# Patient Record
Sex: FEMALE | Race: WHITE | NOT HISPANIC OR LATINO | URBAN - METROPOLITAN AREA
[De-identification: names, ages, dates, MRNs, and addresses within clinical notes are randomized per-mention and may not be internally consistent; named-entity substitution may affect disease eponyms.]

---

## 2023-10-05 ENCOUNTER — LAB (OUTPATIENT)
Dept: LAB | Facility: LAB | Age: 48
End: 2023-10-05
Payer: COMMERCIAL

## 2023-10-05 DIAGNOSIS — G40.309 GENERALIZED IDIOPATHIC EPILEPSY AND EPILEPTIC SYNDROMES, NOT INTRACTABLE, WITHOUT STATUS EPILEPTICUS (MULTI): ICD-10-CM

## 2023-10-05 DIAGNOSIS — R55 SYNCOPE AND COLLAPSE: Primary | ICD-10-CM

## 2023-10-05 LAB
ALBUMIN SERPL-MCNC: 4.5 G/DL (ref 3.5–5)
ALP BLD-CCNC: 69 U/L (ref 35–125)
ALT SERPL-CCNC: 10 U/L (ref 5–40)
ANION GAP SERPL CALC-SCNC: 12 MMOL/L
AST SERPL-CCNC: 12 U/L (ref 5–40)
BASOPHILS # BLD AUTO: 0.04 X10*3/UL (ref 0–0.1)
BASOPHILS NFR BLD AUTO: 0.4 %
BILIRUB SERPL-MCNC: 1.9 MG/DL (ref 0.1–1.2)
BUN SERPL-MCNC: 13 MG/DL (ref 8–25)
CALCIUM SERPL-MCNC: 9.7 MG/DL (ref 8.5–10.4)
CHLORIDE SERPL-SCNC: 105 MMOL/L (ref 97–107)
CO2 SERPL-SCNC: 27 MMOL/L (ref 24–31)
CREAT SERPL-MCNC: 0.6 MG/DL (ref 0.4–1.6)
EOSINOPHIL # BLD AUTO: 0.02 X10*3/UL (ref 0–0.7)
EOSINOPHIL NFR BLD AUTO: 0.2 %
ERYTHROCYTE [DISTWIDTH] IN BLOOD BY AUTOMATED COUNT: 12.4 % (ref 11.5–14.5)
GFR SERPL CREATININE-BSD FRML MDRD: >90 ML/MIN/1.73M*2
GLUCOSE SERPL-MCNC: 86 MG/DL (ref 65–99)
HCT VFR BLD AUTO: 43.9 % (ref 36–46)
HGB BLD-MCNC: 14.3 G/DL (ref 12–16)
IMM GRANULOCYTES # BLD AUTO: 0.04 X10*3/UL (ref 0–0.7)
IMM GRANULOCYTES NFR BLD AUTO: 0.4 % (ref 0–0.9)
LYMPHOCYTES # BLD AUTO: 4.07 X10*3/UL (ref 1.2–4.8)
LYMPHOCYTES NFR BLD AUTO: 38.7 %
MCH RBC QN AUTO: 29.3 PG (ref 26–34)
MCHC RBC AUTO-ENTMCNC: 32.6 G/DL (ref 32–36)
MCV RBC AUTO: 90 FL (ref 80–100)
MONOCYTES # BLD AUTO: 0.55 X10*3/UL (ref 0.1–1)
MONOCYTES NFR BLD AUTO: 5.2 %
NEUTROPHILS # BLD AUTO: 5.8 X10*3/UL (ref 1.2–7.7)
NEUTROPHILS NFR BLD AUTO: 55.1 %
NRBC BLD-RTO: 0 /100 WBCS (ref 0–0)
PLATELET # BLD AUTO: 325 X10*3/UL (ref 150–450)
PMV BLD AUTO: 10.6 FL (ref 7.5–11.5)
POTASSIUM SERPL-SCNC: 3.9 MMOL/L (ref 3.4–5.1)
PROT SERPL-MCNC: 7.9 G/DL (ref 5.9–7.9)
RBC # BLD AUTO: 4.88 X10*6/UL (ref 4–5.2)
SODIUM SERPL-SCNC: 144 MMOL/L (ref 133–145)
TSH SERPL DL<=0.05 MIU/L-ACNC: 0.64 MIU/L (ref 0.27–4.2)
VALPROATE SERPL-MCNC: <3 UG/ML (ref 50–100)
WBC # BLD AUTO: 10.5 X10*3/UL (ref 4.4–11.3)

## 2023-10-05 PROCEDURE — 80164 ASSAY DIPROPYLACETIC ACD TOT: CPT

## 2023-10-05 PROCEDURE — 36415 COLL VENOUS BLD VENIPUNCTURE: CPT

## 2023-10-05 PROCEDURE — 80050 GENERAL HEALTH PANEL: CPT

## 2023-11-10 ENCOUNTER — LAB (OUTPATIENT)
Dept: LAB | Facility: LAB | Age: 48
End: 2023-11-10
Payer: COMMERCIAL

## 2023-11-10 DIAGNOSIS — G40.309 GENERALIZED IDIOPATHIC EPILEPSY AND EPILEPTIC SYNDROMES, NOT INTRACTABLE, WITHOUT STATUS EPILEPTICUS (MULTI): Primary | ICD-10-CM

## 2023-11-10 LAB — VALPROATE SERPL-MCNC: 59 UG/ML (ref 50–100)

## 2023-11-10 PROCEDURE — 80164 ASSAY DIPROPYLACETIC ACD TOT: CPT

## 2024-09-30 ENCOUNTER — APPOINTMENT (OUTPATIENT)
Dept: CARDIOLOGY | Facility: HOSPITAL | Age: 49
End: 2024-09-30
Payer: COMMERCIAL

## 2024-09-30 ENCOUNTER — HOSPITAL ENCOUNTER (EMERGENCY)
Facility: HOSPITAL | Age: 49
Discharge: OTHER INSTITUTION | End: 2024-10-01
Attending: STUDENT IN AN ORGANIZED HEALTH CARE EDUCATION/TRAINING PROGRAM
Payer: COMMERCIAL

## 2024-09-30 DIAGNOSIS — R45.851 SUICIDAL IDEATIONS: Primary | ICD-10-CM

## 2024-09-30 LAB
ALBUMIN SERPL BCP-MCNC: 4.7 G/DL (ref 3.4–5)
ALP SERPL-CCNC: 61 U/L (ref 33–110)
ALT SERPL W P-5'-P-CCNC: 11 U/L (ref 7–45)
ANION GAP SERPL CALCULATED.3IONS-SCNC: 13 MMOL/L (ref 10–20)
APAP SERPL-MCNC: <10 UG/ML
AST SERPL W P-5'-P-CCNC: 14 U/L (ref 9–39)
BASOPHILS # BLD AUTO: 0.03 X10*3/UL (ref 0–0.1)
BASOPHILS NFR BLD AUTO: 0.3 %
BILIRUB SERPL-MCNC: 0.5 MG/DL (ref 0–1.2)
BUN SERPL-MCNC: 10 MG/DL (ref 6–23)
CALCIUM SERPL-MCNC: 10 MG/DL (ref 8.6–10.3)
CHLORIDE SERPL-SCNC: 100 MMOL/L (ref 98–107)
CO2 SERPL-SCNC: 28 MMOL/L (ref 21–32)
CREAT SERPL-MCNC: 0.64 MG/DL (ref 0.5–1.05)
EGFRCR SERPLBLD CKD-EPI 2021: >90 ML/MIN/1.73M*2
EOSINOPHIL # BLD AUTO: 0.08 X10*3/UL (ref 0–0.7)
EOSINOPHIL NFR BLD AUTO: 0.7 %
ERYTHROCYTE [DISTWIDTH] IN BLOOD BY AUTOMATED COUNT: 12.5 % (ref 11.5–14.5)
ETHANOL SERPL-MCNC: <10 MG/DL
GLUCOSE SERPL-MCNC: 98 MG/DL (ref 74–99)
HCT VFR BLD AUTO: 49.6 % (ref 36–46)
HGB BLD-MCNC: 16.1 G/DL (ref 12–16)
IMM GRANULOCYTES # BLD AUTO: 0.03 X10*3/UL (ref 0–0.7)
IMM GRANULOCYTES NFR BLD AUTO: 0.3 % (ref 0–0.9)
LYMPHOCYTES # BLD AUTO: 3.39 X10*3/UL (ref 1.2–4.8)
LYMPHOCYTES NFR BLD AUTO: 28.5 %
MCH RBC QN AUTO: 29.4 PG (ref 26–34)
MCHC RBC AUTO-ENTMCNC: 32.5 G/DL (ref 32–36)
MCV RBC AUTO: 91 FL (ref 80–100)
MONOCYTES # BLD AUTO: 0.63 X10*3/UL (ref 0.1–1)
MONOCYTES NFR BLD AUTO: 5.3 %
NEUTROPHILS # BLD AUTO: 7.75 X10*3/UL (ref 1.2–7.7)
NEUTROPHILS NFR BLD AUTO: 64.9 %
NRBC BLD-RTO: 0 /100 WBCS (ref 0–0)
PLATELET # BLD AUTO: 348 X10*3/UL (ref 150–450)
POTASSIUM SERPL-SCNC: 4.1 MMOL/L (ref 3.5–5.3)
PROT SERPL-MCNC: 8.9 G/DL (ref 6.4–8.2)
RBC # BLD AUTO: 5.48 X10*6/UL (ref 4–5.2)
SALICYLATES SERPL-MCNC: <3 MG/DL
SODIUM SERPL-SCNC: 137 MMOL/L (ref 136–145)
WBC # BLD AUTO: 11.9 X10*3/UL (ref 4.4–11.3)

## 2024-09-30 PROCEDURE — 80320 DRUG SCREEN QUANTALCOHOLS: CPT

## 2024-09-30 PROCEDURE — 81001 URINALYSIS AUTO W/SCOPE: CPT

## 2024-09-30 PROCEDURE — 80053 COMPREHEN METABOLIC PANEL: CPT

## 2024-09-30 PROCEDURE — 93005 ELECTROCARDIOGRAM TRACING: CPT

## 2024-09-30 PROCEDURE — 80143 DRUG ASSAY ACETAMINOPHEN: CPT

## 2024-09-30 PROCEDURE — 85025 COMPLETE CBC W/AUTO DIFF WBC: CPT

## 2024-09-30 PROCEDURE — 90839 PSYTX CRISIS INITIAL 60 MIN: CPT

## 2024-09-30 PROCEDURE — 99285 EMERGENCY DEPT VISIT HI MDM: CPT

## 2024-09-30 PROCEDURE — 81025 URINE PREGNANCY TEST: CPT

## 2024-09-30 PROCEDURE — 36415 COLL VENOUS BLD VENIPUNCTURE: CPT

## 2024-09-30 PROCEDURE — 80307 DRUG TEST PRSMV CHEM ANLYZR: CPT

## 2024-09-30 SDOH — HEALTH STABILITY: MENTAL HEALTH: NON-SPECIFIC ACTIVE SUICIDAL THOUGHTS (PAST 1 MONTH): YES

## 2024-09-30 SDOH — HEALTH STABILITY: MENTAL HEALTH: HAVE YOU EVER TRIED TO KILL YOURSELF?: NO

## 2024-09-30 SDOH — ECONOMIC STABILITY: HOUSING INSECURITY: FEELS SAFE LIVING IN HOME: OTHER (COMMENT)

## 2024-09-30 SDOH — HEALTH STABILITY: MENTAL HEALTH: IN THE PAST WEEK, HAVE YOU BEEN HAVING THOUGHTS ABOUT KILLING YOURSELF?: YES

## 2024-09-30 SDOH — HEALTH STABILITY: MENTAL HEALTH: SUICIDAL BEHAVIOR (LIFETIME): NO

## 2024-09-30 SDOH — HEALTH STABILITY: MENTAL HEALTH: ACTIVE SUICIDAL IDEATION WITH SPECIFIC PLAN AND INTENT (PAST 1 MONTH): NO

## 2024-09-30 SDOH — HEALTH STABILITY: MENTAL HEALTH: IN THE PAST FEW WEEKS, HAVE YOU FELT THAT YOU OR YOUR FAMILY WOULD BE BETTER OFF IF YOU WERE DEAD?: YES

## 2024-09-30 SDOH — HEALTH STABILITY: MENTAL HEALTH: ACTIVE SUICIDAL IDEATION WITH SOME INTENT TO ACT, WITHOUT SPECIFIC PLAN (PAST 1 MONTH): NO

## 2024-09-30 SDOH — ECONOMIC STABILITY: GENERAL: FINANCIAL CONCERNS: UNABLE TO MEET BASIC NEEDS;OTHER (COMMENT)

## 2024-09-30 SDOH — HEALTH STABILITY: MENTAL HEALTH: WISH TO BE DEAD (PAST 1 MONTH): YES

## 2024-09-30 SDOH — HEALTH STABILITY: MENTAL HEALTH: ARE YOU HAVING THOUGHTS OF KILLING YOURSELF RIGHT NOW?: NO

## 2024-09-30 SDOH — HEALTH STABILITY: MENTAL HEALTH: ANXIETY SYMPTOMS: GENERALIZED

## 2024-09-30 SDOH — HEALTH STABILITY: MENTAL HEALTH: DEPRESSION SYMPTOMS: FEELINGS OF HOPELESSESS;FEELINGS OF HELPLESSNESS

## 2024-09-30 SDOH — HEALTH STABILITY: MENTAL HEALTH: IN THE PAST FEW WEEKS, HAVE YOU WISHED YOU WERE DEAD?: YES

## 2024-09-30 ASSESSMENT — PAIN SCALES - GENERAL: PAINLEVEL_OUTOF10: 0 - NO PAIN

## 2024-09-30 ASSESSMENT — LIFESTYLE VARIABLES
EVER FELT BAD OR GUILTY ABOUT YOUR DRINKING: NO
TOTAL SCORE: 0
SUBSTANCE_ABUSE_PAST_12_MONTHS: NO
PRESCIPTION_ABUSE_PAST_12_MONTHS: NO
HAVE PEOPLE ANNOYED YOU BY CRITICIZING YOUR DRINKING: NO
EVER HAD A DRINK FIRST THING IN THE MORNING TO STEADY YOUR NERVES TO GET RID OF A HANGOVER: NO
HAVE YOU EVER FELT YOU SHOULD CUT DOWN ON YOUR DRINKING: NO

## 2024-09-30 ASSESSMENT — COLUMBIA-SUICIDE SEVERITY RATING SCALE - C-SSRS
6. HAVE YOU EVER DONE ANYTHING, STARTED TO DO ANYTHING, OR PREPARED TO DO ANYTHING TO END YOUR LIFE?: NO
1. IN THE PAST MONTH, HAVE YOU WISHED YOU WERE DEAD OR WISHED YOU COULD GO TO SLEEP AND NOT WAKE UP?: NO
2. HAVE YOU ACTUALLY HAD ANY THOUGHTS OF KILLING YOURSELF?: NO

## 2024-09-30 ASSESSMENT — PAIN - FUNCTIONAL ASSESSMENT: PAIN_FUNCTIONAL_ASSESSMENT: 0-10

## 2024-09-30 NOTE — ED PROVIDER NOTES
"Patient was seen by both myself and advanced practitioner.  I personally saw the patient and made/approved the management plan and take responsibility for the patient management     Patient is a 49-year-old female that presents to the emergency department for evaluation of suicidal ideation.  She was brought in by police after making statements  earlier stating that she wanted to kill herself and that she would do it if she had a gun.  Patient does not have access to gun at this time and states that she was just \"joking\".  Patient was placed on involuntary hold by police and brought in for further psychiatric evaluation.  Patient does admit to saying that these things and that they were unprompted.  She states that she is struggling and needs to find help with housing as this has become difficult since losing her .    On exam patient anxious appearing and is very fidgety.  She has trouble sitting still at this time.  She does admit that she stated that she was suicidal however states that she was only joking and has no intent of actually hurting herself.  She states she is never tried in the past.  Respirations easy, nonlabored.  She is hemodynamically stable.  She is ambulating with a steady gait at this time.  She does not appear internally stimulated.  Blood work ordered for medical screening purposes including CBC, CMP, serum drug screen, urine drug screen, urinalysis.  Patient on involuntary hold at this time and will be evaluated by ED social work once medically cleared for placement as I am very concerned as she has multiple stressors and risk factors and I do not feel it is safe for patient to go home at this time.  Patient was evaluated by ED crisis who also agrees patient would benefit from inpatient psychiatric treatment.  Patient accepted for further inpatient treatment by Dr. Contreras at [facility].    I independently interpreted the following study(s) EKG which show normal sinus rhythm at 82 " bpm, no evidence of STEMI.       Demond Alvarado,   09/30/24 2016

## 2024-09-30 NOTE — ED TRIAGE NOTES
Third party called 911 after pt made suicidal threats, denies suicidal ideations for EMS. Pink slipped by PD. Pt recently had their spouse pass away. Pt is staying at friends' home, pt is homeless at this time. Pt denies suicidal ideations at facility, is requesting  to cope with loss. Pt denies drug use.

## 2024-09-30 NOTE — ED PROVIDER NOTES
"HPI   Chief Complaint   Patient presents with    Psychiatric Evaluation       Patient is a 49-year-old female with past medical history of cerebral palsy presenting via the police as a pink slipped for suicidal ideations.  Report is that patient made suicidal comments earlier stating that she would like to kill herself if she had a gun.  She states that he does not have access to a gun at this time.  States she has never had these thoughts before.  She states that she was \"joking\".  She states she is not feeling violent towards herself or others.  Denies AVH.  Denies tobacco, illicit drug, or alcohol use.  States she has not been hospitalized for mental health.  Patient denies fevers, chills, cough, sore throat, runny nose, chest pain, shortness of breath, abdominal pain, nausea, vomiting, diarrhea or urinary complaints.  Patient does endorse that she is sleeping with a friend due to her  having recently passed away.  She states that a friend is the one that called 911.               Patient History   No past medical history on file.  No past surgical history on file.  No family history on file.  Social History     Tobacco Use    Smoking status: Not on file    Smokeless tobacco: Not on file   Substance Use Topics    Alcohol use: Not on file    Drug use: Not on file       Physical Exam   ED Triage Vitals   Temp Pulse Resp BP   -- -- -- --      SpO2 Temp src Heart Rate Source Patient Position   -- -- -- --      BP Location FiO2 (%)     -- --       Physical Exam  Vitals and nursing note reviewed.   Constitutional:       Appearance: She is well-developed.      Comments: Awake, sitting in EMS cot   HENT:      Head: Normocephalic and atraumatic.      Nose: Nose normal.      Mouth/Throat:      Mouth: Mucous membranes are moist.      Comments: Poor dentition noted  Eyes:      Extraocular Movements: Extraocular movements intact.      Conjunctiva/sclera: Conjunctivae normal.      Pupils: Pupils are equal, round, and " reactive to light.   Cardiovascular:      Rate and Rhythm: Normal rate and regular rhythm.      Pulses: Normal pulses.      Heart sounds: Normal heart sounds. No murmur heard.  Pulmonary:      Effort: Pulmonary effort is normal. No respiratory distress.      Breath sounds: Normal breath sounds.   Abdominal:      General: Abdomen is flat.      Palpations: Abdomen is soft.      Tenderness: There is no abdominal tenderness.   Musculoskeletal:         General: No swelling. Normal range of motion.      Cervical back: Normal range of motion and neck supple.   Skin:     General: Skin is warm and dry.      Capillary Refill: Capillary refill takes less than 2 seconds.   Neurological:      General: No focal deficit present.      Mental Status: She is alert and oriented to person, place, and time.   Psychiatric:         Mood and Affect: Mood normal.         Behavior: Behavior normal.           ED Course & MDM   ED Course as of 10/01/24 0101   Mon Sep 30, 2024   1820 EKG Time:1819  EKG Interpretation time:1820  EKG Interpretation: EKG shows normal sinus rhythm with a rate 82 bpm, normal axis, QTc normal at 422, no evidence of STEMI.    EKG was interpreted by myself independently [JL]   Tue Oct 01, 2024   0057 Urinalysis with Reflex Culture and Microscopic(!)  No evidence of urinary tract infection [EG]   0057 Drug Screen, Urine(!)  Positive for cannabis and otherwise negative [EG]      ED Course User Index  [EG] Josefina Matson MD  [JL] Demond Alvarado,          Diagnoses as of 10/01/24 0101   Suicidal ideations                 No data recorded     Una Coma Scale Score: 15 (09/30/24 1543 : Diego Barron, EMT)                           Medical Decision Making  Patient is a 49-year-old female with past medical history of cerebral palsy presenting via the police as a pink slip for suicidal ideations.  Lab work, urine ordered.  Social work consult placed.  Conditions considered include but are not limited to:  Anxiety, depression, bipolar disorder, schizophrenia.    I saw this patient in conjunction with Dr. Alvarado.  CBC does show mild leukocytosis with WBCs of 11.9 as well as hemoglobin of 16.1.  This appears to be hemoconcentration.  CMP without significant electrolyte abnormality or renal impairment.  Toxicology panel is negative.  Urine pregnancy is negative.  Urine drug screen is positive for cannabinoid use.  UA with micro is without infection.    Patient is medically cleared for placement.  Patient has been accepted to Dr. Contreras at [facility].  As I deemed necessary from the patient's history, physical, laboratory and imaging findings as well as ED course, I considered the above listed diagnoses.  Patient is currently pending transport    Portions of this note made with Dragon software, please be mindful of potential grammatical errors.        Labs Reviewed   CBC WITH AUTO DIFFERENTIAL - Abnormal       Result Value    WBC 11.9 (*)     nRBC 0.0      RBC 5.48 (*)     Hemoglobin 16.1 (*)     Hematocrit 49.6 (*)     MCV 91      MCH 29.4      MCHC 32.5      RDW 12.5      Platelets 348      Neutrophils % 64.9      Immature Granulocytes %, Automated 0.3      Lymphocytes % 28.5      Monocytes % 5.3      Eosinophils % 0.7      Basophils % 0.3      Neutrophils Absolute 7.75 (*)     Immature Granulocytes Absolute, Automated 0.03      Lymphocytes Absolute 3.39      Monocytes Absolute 0.63      Eosinophils Absolute 0.08      Basophils Absolute 0.03     COMPREHENSIVE METABOLIC PANEL - Abnormal    Glucose 98      Sodium 137      Potassium 4.1      Chloride 100      Bicarbonate 28      Anion Gap 13      Urea Nitrogen 10      Creatinine 0.64      eGFR >90      Calcium 10.0      Albumin 4.7      Alkaline Phosphatase 61      Total Protein 8.9 (*)     AST 14      Bilirubin, Total 0.5      ALT 11     DRUG SCREEN,URINE - Abnormal    Amphetamine Screen, Urine Presumptive Negative      Barbiturate Screen, Urine Presumptive Negative       Benzodiazepines Screen, Urine Presumptive Negative      Cannabinoid Screen, Urine Presumptive Positive (*)     Cocaine Metabolite Screen, Urine Presumptive Negative      Fentanyl Screen, Urine Presumptive Negative      Opiate Screen, Urine Presumptive Negative      Oxycodone Screen, Urine Presumptive Negative      PCP Screen, Urine Presumptive Negative      Methadone Screen, Urine Presumptive Negative      Narrative:     Drug screen results are presumptive and should not be used to assess   compliance with prescribed medication. Contact the performing [facility] laboratory   to add-on definitive confirmatory testing if clinically indicated.    Toxicology screening results are reported qualitatively. The concentration must   be greater than or equal to the cutoff to be reported as positive. The concentration   at which the screening test can detect an individual drug or metabolite varies.   The absence of expected drug(s) and/or drug metabolite(s) may indicate non-compliance,   inappropriate timing of specimen collection relative to drug administration, poor drug   absorption, diluted/adulterated urine, or limitations of testing. For medical purposes   only; not valid for forensic use.    Interpretive questions should be directed to the laboratory medical directors.   URINALYSIS WITH REFLEX CULTURE AND MICROSCOPIC - Abnormal    Color, Urine Light-Yellow      Appearance, Urine Clear      Specific Gravity, Urine 1.014      pH, Urine 5.5      Protein, Urine NEGATIVE      Glucose, Urine Normal      Blood, Urine 0.2 (2+) (*)     Ketones, Urine NEGATIVE      Bilirubin, Urine NEGATIVE      Urobilinogen, Urine Normal      Nitrite, Urine NEGATIVE      Leukocyte Esterase, Urine NEGATIVE     URINALYSIS MICROSCOPIC WITH REFLEX CULTURE - Abnormal    WBC, Urine 1-5      RBC, Urine >20 (*)     Squamous Epithelial Cells, Urine 1-9 (SPARSE)      Mucus, Urine 1+     ACUTE TOXICOLOGY PANEL, BLOOD - Normal    Acetaminophen <10.0       Salicylate  <3      Alcohol <10     HCG, URINE, QUALITATIVE - Normal    HCG, Urine NEGATIVE     URINALYSIS WITH REFLEX CULTURE AND MICROSCOPIC    Narrative:     The following orders were created for panel order Urinalysis with Reflex Culture and Microscopic.  Procedure                               Abnormality         Status                     ---------                               -----------         ------                     Urinalysis with Reflex C...[[id number]]  Abnormal            Final result               Extra Urine Gray Tube[[id number]]                                                         Please view results for these tests on the individual orders.   EXTRA URINE GRAY TUBE         Procedure  Procedures     Villa Schulte PA-C  10/01/24 1454

## 2024-09-30 NOTE — PROGRESS NOTES
"EPAT - Social Work Psychiatric Assessment    Arrival Details  Mode of Arrival: Ambulance  Admission Source: Other (Comment)  Admission Type: Involuntary  EPAT Assessment Start Date: 09/30/24  EPAT Assessment Start Time:  (17:20)  Name of : ENOCH Hernandez    History of Present Illness  Admission Reason: Psychiatric Evaluation  HPI: Pt is a 48 y/o F brought in by [facility] under pink slip by [place] police. Per report pt's friend Kristin contacted 911 after pt made SI threat stating \"if I had a gun I would kill myself\". Upon arrival to the scene police state pt denied SI stating she was \"joking\" however pt having multiple stressors recently including loss of  and current homelessness. Social work reviewed Pts chart, medical record, and provider notes which indicate no history of mental health or psychiatric admissions. Records do not show any medication list. Pt has medical history of cerebral palsy and has been seen for physical complaints in the past. Pt continues to deny SI upon arrival to ED staff. The triage risk assessment was reviewed and Pt was inidcated to be no risk during triage assessment however EPAT consult placed as ED provider had concerns for pt's safety.    SW Readmission Information   Readmission within 30 Days: No    Psychiatric Symptoms  Anxiety Symptoms: Generalized  Depression Symptoms: Feelings of hopelessess, Feelings of helplessness  Windy Symptoms: No problems reported or observed.    Psychosis Symptoms  Hallucination Type: No problems reported or observed.  Delusion Type: No problems reported or observed.    Additional Symptoms - Adult  Generalized Anxiety Disorder: No problems reported or observed., Excessive anxiety/worry, Irritability, Difficult to control worry  Panic Attack: No problems reported or observed.  Post Traumatic Stress Disorder: No problems reported or observed.  Delirium: No problems reported or observed.    Past Psychiatric " History/Meds/Treatments  Past Psychiatric History: Pt ina any history of mental illness.  No history present in medical records.  Past Psychiatric Meds/Treatments: Pt ina any psychiatric admissions and ina any mental health providers.  Past Violence/Victimization History: None reported    Current Mental Health Contacts   Name/Phone Number: None  Provider Name/Phone Number: None    Support System: Other (Comment) (Pt has minimal support.)    Living Arrangement: Homeless    Home Safety  Feels Safe Living in Home: Other (Comment) (Pt is currently homeless.)    Income Information  Employment Status for: Patient  Employment Status: Unemployed  Income Source: Unemployed  Financial Concerns: Unable to Meet Basic Needs, Other (Comment) (Pt is currently seeking assitance for houseing.)    [place] Service/Education History  Current or Previous  Service: None    Social/Cultural History  Social History: Pt  recently passed away.  Pt has limited support system.  Cultural Requests During Hospitalization: None  Spiritual Requests During Hospitalization: None    Legal  Legal Considerations: Patient/ Family Ability to Make Healthcare Decisions  Assistance with Managing/Advocating Healthcare Needs: Other (Comment)  Criminal Activity/ Legal Involvement Pertinent to Current Situation/ Hospitalization: None  Legal Concerns: None  Legal Comments: Legal guardian/self, POA/self.    Drug Screening  Have you used any substances (canabis, cocaine, heroin, hallucinogens, inhalants, etc.) in the past 12 months?: No  Have you used any prescription drugs other than prescribed in the past 12 months?: No  Is a toxicology screen needed?: Yes    Stage of Change  Stage of Change: Other (Comment) (N/A)    Behavioral Health  Behavioral Health(WDL): Exceptions to WDL  Behaviors/Mood: Anxious, Impulsive, Irritable  Affect: Inconsistent with thought content  Parent/Guardian/Significant Other Involvement: No  involvement  Needs Expressed: Financial, Emotional    Orientation  Orientation Level: Disoriented X4    General Appearance  Motor Activity: Mannerisms  Speech Pattern: Rapid  General Attitude: Defensive  Appearance/Hygiene: Disheveled, Poor hygiene    Thought Process  Coherency: Disorganized  Content: Unremarkable  Perception: Unable to assess  Hallucination: None  Judgment/Insight: Impaired  Confusion: Mild  Cognition: Poor safety awareness, Poor judgement    Sleep Pattern  Sleep Pattern: Unable to assess    Risk Factors  Self Harm/Suicidal Ideation Plan: Pt aknowledging statements were made however minimizing severity.  Previous Self Harm/Suicidal Plans: Pt ina any past history and records do not show any prior SI.  Risk Factors: Lower socioeconomic status, Unemployment, Other (Comment)    Violence Risk Assessment  Assessment of Violence: None noted  Thoughts of Harm to Others: No    Ability to Assess Risk Screen  Risk Screen - Ability to Assess: Able to be screened  Ask Suicide-Screening Questions  1. In the past few weeks, have you wished you were dead?: Yes  2. In the past few weeks, have you felt that you or your family would be better off if you were dead?: Yes  3. In the past week, have you been having thoughts about killing yourself?: Yes  4. Have you ever tried to kill yourself?: No  5. Are you having thoughts of killing yourself right now?: No  Calculated Risk Score: Potential Risk  [place]orrow Suicide Severity Rating Scale (Screener/Recent Self-Report)  1. Wish to be Dead (Past 1 Month): Yes  2. Non-Specific Active Suicidal Thoughts (Past 1 Month): Yes  3. Active Suicidal Ideation with any Methods (Not Plan) Without Intent to Act (Past 1 Month): No  4. Active Suicidal Ideation with Some Intent to Act, Without Specific Plan (Past 1 Month): No  5. Active Suicidal Ideation with Specific Plan and Intent (Past 1 Month): No  6. Suicidal Behavior (Lifetime): No  Calculated C-SSRS Risk Score (Lifetime/Recent):  "Low Risk  Step 1: Risk Factors  Current & Past Psychiatric Dx: Recent onset  Presenting Symptoms: Hopelessness or despair, Anxiety and/or panic  Precipitants/Stressors: Triggering events leading to humiliation, shame, and/or despair (e.g. loss of relationship, financial or health status) (real or anticipated), Inadequate social supports, Pending incarceration or homelessness  Change in Treatment: Non-compliant or not receiving treatment  Access to Lethal Methods : No  Step 2: Protective Factors   Protective Factors Internal: Other (Comment) (None)  Protective Factors External: Supportive social network or family or friends  Step 3: Suicidal Ideation Intensity  How Many Times Have You Had These Thoughts: Less than once a week  When You Have the Thoughts How Long do They Last : Fleeting - few seconds or minutes  Could/Can You Stop Thinking About Killing Yourself or Wanting to Die if You Want to: Easily able to control thoughts  Are There Things - Anyone or Anything - That Stopped You From Wanting to Die or Acting on: Uncertain that deterrents stopped you  What Sort of Reasons Did You Have For Thinking About Wanting to Die or Killing Yourself: Mostly to end or stop the pain (you couldn't go on living with the pain or how you were feeling)  Total Score: 10  Step 5: Documentation  Risk Level: Low suicide risk    Psychiatric Impression and Plan of Care  Assessment and Plan: Upon assessment Pt presents cooperative but somewhat anxious stating \"I just need a  and help with housing\". Pt minimizing severity of statements made to friend Kristin earlier today. Pt repeatedly states \"I am not that way\" and \"I would never hurt myself or anyone else\" but admits she is going through a lot this past week with  passing and not having stable housing at this time. Pt poor historian with what brought on comments today. She denies that she and friend had any arguments but reports Kristin was helping her look for somewhere " "else to stay. Pt has been staying Kristin's brother's home with her stating \"Kristin was a good friend of my \". Pt appears to believe she can not longer stay at the house with them though and repeatedly tells EPAT that she has \"multiple friends\" she can stay with. Pt could not provide a single name or phone number of a friend EPAT could contact to verify whether pt could safe. EPAT did attempt to contact Kristin for collateral information however no answer. Pt has been dealing with the stress and loss of her  this past Thursday. Pt denies any mental health history and denies being on any medications. She continues to state she \"was joking\" and that she \"is not that way\" however pt unable to guarantee safety at this time. After speaking with Pt, recieved collateral from friend Milad who verified Pt has no history of SI behavior.  He would be willing to allow her to staty with him however needs to confirm with sister as it is her house. Pt presents with low risk at time of social work assessment.  Specific Resources Provided to Patient: Peer support not needed.  Plan Comments: Diagnostic Impression:  Depressive Disorder, NOS, secondary to grief. Plan will be to await call back from Milad to verify if good safety plan is set in place.    Outcome/Disposition  Patient's Perception of Outcome Achieved: Pt has limited insite into plan at this time.  Assessment, Recommendations and Risk Level Reviewed with: Reviewed case with Dr. Alvarado and HOANG Herrera.  Contact Name: Milad  Contact Number(s): [phone]  Contact Relationship: Friend.  EPAT Assessment Completed Date: 09/30/24  EPAT Assessment Completed Time: 1815      "

## 2024-09-30 NOTE — ED NOTES
Pt will not sit still for EKG exam this RN tried several times. Pt keeps getting out of bed and saying no I can't sit still.      Sharon Olvera RN  09/30/24 8533

## 2024-09-30 NOTE — PROGRESS NOTES
"EPAT - Social Work Psychiatric Assessment    Arrival Details  Mode of Arrival: Ambulance  Admission Source: Other (Comment)  Admission Type: Involuntary  EPAT Assessment Start Date: 09/30/24  EPAT Assessment Start Time:  (17:20)  Name of : ENOCH Hernandez    History of Present Illness  Admission Reason: Psychiatric Evaluation  HPI: Pt is a 48 y/o F brought in by [facility] under pink slip by [place] police. Per report pt's friend Kristin contacted 911 after pt made SI threat stating \"if I had a gun I would kill myself\". Upon arrival to the scene police state pt denied SI stating she was \"joking\" however pt having multiple stressors recently including loss of  and current homelessness. Social work reviewed Pts chart, medical record, and provider notes which indicate no history of mental health or psychiatric admissions. Records do not show any medication list. Pt has medical history of cerebral palsy and has been seen for physical complaints in the past. Pt continues to deny SI upon arrival to ED staff. The triage risk assessment was reviewed and Pt was inidcated to be no risk during triage assessment however EPAT consult placed as ED provider had concerns for pt's safety.    SW Readmission Information   Readmission within 30 Days: No    Psychiatric Symptoms  Anxiety Symptoms: Generalized  Depression Symptoms: Feelings of hopelessess, Feelings of helplessness  Windy Symptoms: No problems reported or observed.    Psychosis Symptoms  Hallucination Type: No problems reported or observed.  Delusion Type: No problems reported or observed.    Additional Symptoms - Adult  Generalized Anxiety Disorder: No problems reported or observed., Excessive anxiety/worry, Irritability, Difficult to control worry  Panic Attack: No problems reported or observed.  Post Traumatic Stress Disorder: No problems reported or observed.  Delirium: No problems reported or observed.    Past Psychiatric " History/Meds/Treatments  Past Psychiatric History: Pt denies any history of mental illness.  No history present in medical records.  Past Psychiatric Meds/Treatments: Pt denies any psychiatric admissions and ina any mental health providers.  Past Violence/Victimization History: None reported    Current Mental Health Contacts   Name/Phone Number: None  Provider Name/Phone Number: None    Support System: Other (Comment) (Pt has minimal support.)    Living Arrangement: Homeless    Home Safety  Feels Safe Living in Home: Other (Comment) (Pt is currently homeless.)    Income Information  Employment Status for: Patient  Employment Status: Unemployed  Income Source: Unemployed  Financial Concerns: Unable to Meet Basic Needs, Other (Comment) (Pt is currently seeking assistance for housing.)    Milt[place] Service/Education History  Current or Previous  Service: None    Social/Cultural History  Social History: Pt  recently passed away.  Pt has limited support system.  Cultural Requests During Hospitalization: None  Spiritual Requests During Hospitalization: None    Legal  Legal Considerations: Patient/ Family Ability to Make Healthcare Decisions  Assistance with Managing/Advocating Healthcare Needs: Other (Comment)  Criminal Activity/ Legal Involvement Pertinent to Current Situation/ Hospitalization: None  Legal Concerns: None  Legal Comments: Legal guardian/self, POA/self.    Drug Screening  Have you used any substances (canabis, cocaine, heroin, hallucinogens, inhalants, etc.) in the past 12 months?: No  Have you used any prescription drugs other than prescribed in the past 12 months?: No  Is a toxicology screen needed?: Yes    Stage of Change  Stage of Change: Other (Comment) (N/A)    Behavioral Health  Behavioral Health(WDL): Exceptions to WDL  Behaviors/Mood: Anxious, Impulsive, Irritable  Affect: Inconsistent with thought content  Parent/Guardian/Significant Other Involvement: No  involvement  Needs Expressed: Financial, Emotional    Orientation  Orientation Level: Disoriented X4    General Appearance  Motor Activity: Mannerisms  Speech Pattern: Rapid  General Attitude: Defensive  Appearance/Hygiene: Disheveled, Poor hygiene    Thought Process  Coherency: Disorganized  Content: Unremarkable  Perception: Unable to assess  Hallucination: None  Judgment/Insight: Impaired  Confusion: Mild  Cognition: Poor safety awareness, Poor judgement    Sleep Pattern  Sleep Pattern: Unable to assess    Risk Factors  Self Harm/Suicidal Ideation Plan: Pt aknowledging statements were made however minimizing severity.  Previous Self Harm/Suicidal Plans: Pt denies any past history and records do not show any prior SI.  Risk Factors: Lower socioeconomic status, Unemployment, Other (Comment)    Violence Risk Assessment  Assessment of Violence: None noted  Thoughts of Harm to Others: No    Ability to Assess Risk Screen  Risk Screen - Ability to Assess: Able to be screened  Ask Suicide-Screening Questions  1. In the past few weeks, have you wished you were dead?: Yes  2. In the past few weeks, have you felt that you or your family would be better off if you were dead?: Yes  3. In the past week, have you been having thoughts about killing yourself?: Yes  4. Have you ever tried to kill yourself?: No  5. Are you having thoughts of killing yourself right now?: No  Calculated Risk Score: Potential Risk  [place] Suicide Severity Rating Scale (Screener/Recent Self-Report)  1. Wish to be Dead (Past 1 Month): Yes  2. Non-Specific Active Suicidal Thoughts (Past 1 Month): Yes  3. Active Suicidal Ideation with any Methods (Not Plan) Without Intent to Act (Past 1 Month): No  4. Active Suicidal Ideation with Some Intent to Act, Without Specific Plan (Past 1 Month): No  5. Active Suicidal Ideation with Specific Plan and Intent (Past 1 Month): No  6. Suicidal Behavior (Lifetime): No  Calculated C-SSRS Risk Score (Lifetime/Recent):  "Low Risk  Step 1: Risk Factors  Current & Past Psychiatric Dx: Recent onset  Presenting Symptoms: Hopelessness or despair, Anxiety and/or panic  Precipitants/Stressors: Triggering events leading to humiliation, shame, and/or despair (e.g. loss of relationship, financial or health status) (real or anticipated), Inadequate social supports, Pending incarceration or homelessness  Change in Treatment: Non-compliant or not receiving treatment  Access to Lethal Methods : No  Step 2: Protective Factors   Protective Factors Internal: Other (Comment) (None)  Protective Factors External: Supportive social network or family or friends  Step 3: Suicidal Ideation Intensity  How Many Times Have You Had These Thoughts: Less than once a week  When You Have the Thoughts How Long do They Last : Fleeting - few seconds or minutes  Could/Can You Stop Thinking About Killing Yourself or Wanting to Die if You Want to: Easily able to control thoughts  Are There Things - Anyone or Anything - That Stopped You From Wanting to Die or Acting on: Uncertain that deterrents stopped you  What Sort of Reasons Did You Have For Thinking About Wanting to Die or Killing Yourself: Mostly to end or stop the pain (you couldn't go on living with the pain or how you were feeling)  Total Score: 10  Step 5: Documentation  Risk Level: Low suicide risk    Psychiatric Impression and Plan of Care  Assessment and Plan: Upon assessment Pt presents cooperative but somewhat anxious stating \"I just need a  and help with housing\". Pt minimizing severity of statements made to friend Kristin earlier today. Pt repeatedly states \"I am not that way\" and \"I would never hurt myself or anyone else\" but admits she is going through a lot this past week with  passing and not having stable housing at this time. Pt poor historian with what brought on comments today. She denies that she and friend had any arguments but reports Kristin was helping her look for somewhere " "else to stay. Pt has been staying Kristin's brother's home with her stating \"Kristin was a good friend of my \". Pt appears to believe she can not longer stay at the house with them though and repeatedly tells EPAT that she has \"multiple friends\" she can stay with. Pt could not provide a single name or phone number of a friend EPAT could contact to verify whether pt could safe. EPAT did attempt to contact Kristin for collateral information however no answer. Pt has been dealing with the stress and loss of her  this past Thursday. Pt denies any mental health history and denies being on any medications. She continues to state she \"was joking\" and that she \"is not that way\" however pt unable to guarantee safety at this time. After speaking with Pt, recieved collateral from friend Milad who verified Pt has no history of SI behavior.  He would be willing to allow her to safety with him however needs to confirm with sister as it is her house. Pt presents with low risk at time of social work assessment.  Specific Resources Provided to Patient: Peer support not needed.  Plan Comments: Diagnostic Impression:  Depressive Disorder, NOS, secondary to grief. Plan will be to await call back from Milad to verify if good safety plan is set in place.    Outcome/Disposition  Patient's Perception of Outcome Achieved: Pt has limited insight into plan at this time.  Assessment, Recommendations and Risk Level Reviewed with: Reviewed case with Dr. Alvarado and HOANG Herrera.  Contact Name: Milad  Contact Number(s): [phone]  Contact Relationship: Friend.  EPAT Assessment Completed Date: 09/30/24  EPAT Assessment Completed Time: 1815      "

## 2024-09-30 NOTE — PROGRESS NOTES
"EPAT - Social Work Psychiatric Assessment    Arrival Details  Mode of Arrival: Ambulance  Admission Source: Other (Comment)  Admission Type: Involuntary  EPAT Assessment Start Date: 09/30/24  EPAT Assessment Start Time:  (17:20)  Name of : ENOCH Hernandez    History of Present Illness  Admission Reason: Psychiatric Evaluation  HPI: Pt is a 50 y/o F brought in by [facility] under pink slip by [place] police. Per report pt's friend Kristin contacted 911 after pt made SI threat stating \"if I had a gun I would kill myself\". Upon arrival to the scene police state pt denied SI stating she was \"joking\" however pt having multiple stressors recently including loss of  and current homelessness. Social work reviewed Pts chart, medical record, and provider notes which indicate no history of mental health or psychiatric admissions. Records do not show any medication list. Pt has medical history of cerebral palsy and has been seen for physical complaints in the past. Pt continues to deny SI upon arrival to ED staff. The triage risk assessment was reviewed and Pt was inidcated to be no risk during triage assessment however EPAT consult placed as ED provider had concerns for pt's safety.    SW Readmission Information   Readmission within 30 Days: No    Psychiatric Symptoms  Anxiety Symptoms: Generalized  Depression Symptoms: Feelings of hopelessess, Feelings of helplessness  Windy Symptoms: No problems reported or observed.    Psychosis Symptoms  Hallucination Type: No problems reported or observed.  Delusion Type: No problems reported or observed.    Additional Symptoms - Adult  Generalized Anxiety Disorder: No problems reported or observed., Excessive anxiety/worry, Irritability, Difficult to control worry  Panic Attack: No problems reported or observed.  Post Traumatic Stress Disorder: No problems reported or observed.  Delirium: No problems reported or observed.    Past Psychiatric " History/Meds/Treatments  Past Psychiatric History: Pt denies any history of mental illness.  No history present in medical records.  Past Psychiatric Meds/Treatments: Pt denies any psychiatric admissions and ina any mental health providers.  Past Violence/Victimization History: None reported    Current Mental Health Contacts   Name/Phone Number: None  Provider Name/Phone Number: None    Support System: Other (Comment) (Pt has minimal support.)    Living Arrangement: Homeless    Home Safety  Feels Safe Living in Home: Other (Comment) (Pt is currently homeless.)    Income Information  Employment Status for: Patient  Employment Status: Unemployed  Income Source: Unemployed  Financial Concerns: Unable to Meet Basic Needs, Other (Comment) (Pt is currently seeking assistance for housing.)    [place] Service/Education History  Current or Previous  Service: None    Social/Cultural History  Social History: Pt  recently passed away.  Pt has limited support system.  Cultural Requests During Hospitalization: None  Spiritual Requests During Hospitalization: None    Legal  Legal Considerations: Patient/ Family Ability to Make Healthcare Decisions  Assistance with Managing/Advocating Healthcare Needs: Other (Comment)  Criminal Activity/ Legal Involvement Pertinent to Current Situation/ Hospitalization: None  Legal Concerns: None  Legal Comments: Legal guardian/self, POA/self.    Drug Screening  Have you used any substances (canabis, cocaine, heroin, hallucinogens, inhalants, etc.) in the past 12 months?: No  Have you used any prescription drugs other than prescribed in the past 12 months?: No  Is a toxicology screen needed?: Yes    Stage of Change  Stage of Change: Other (Comment) (N/A)    Behavioral Health  Behavioral Health(WDL): Exceptions to WDL  Behaviors/Mood: Anxious, Impulsive, Irritable  Affect: Inconsistent with thought content  Parent/Guardian/Significant Other Involvement: No  involvement  Needs Expressed: Financial, Emotional    Orientation  Orientation Level: Disoriented X4    General Appearance  Motor Activity: Mannerisms  Speech Pattern: Rapid  General Attitude: Defensive  Appearance/Hygiene: Disheveled, Poor hygiene    Thought Process  Coherency: Disorganized  Content: Unremarkable  Perception: Unable to assess  Hallucination: None  Judgment/Insight: Impaired  Confusion: Mild  Cognition: Poor safety awareness, Poor judgement    Sleep Pattern  Sleep Pattern: Unable to assess    Risk Factors  Self Harm/Suicidal Ideation Plan: Pt aknowledging statements were made however minimizing severity.  Previous Self Harm/Suicidal Plans: Pt denies any past history and records do not show any prior SI.  Risk Factors: Lower socioeconomic status, Unemployment, Other (Comment)    Violence Risk Assessment  Assessment of Violence: None noted  Thoughts of Harm to Others: No    Ability to Assess Risk Screen  Risk Screen - Ability to Assess: Able to be screened  Ask Suicide-Screening Questions  1. In the past few weeks, have you wished you were dead?: Yes  2. In the past few weeks, have you felt that you or your family would be better off if you were dead?: Yes  3. In the past week, have you been having thoughts about killing yourself?: Yes  4. Have you ever tried to kill yourself?: No  5. Are you having thoughts of killing yourself right now?: No  Calculated Risk Score: Potential Risk  [place] Suicide Severity Rating Scale (Screener/Recent Self-Report)  1. Wish to be Dead (Past 1 Month): Yes  2. Non-Specific Active Suicidal Thoughts (Past 1 Month): Yes  3. Active Suicidal Ideation with any Methods (Not Plan) Without Intent to Act (Past 1 Month): No  4. Active Suicidal Ideation with Some Intent to Act, Without Specific Plan (Past 1 Month): No  5. Active Suicidal Ideation with Specific Plan and Intent (Past 1 Month): No  6. Suicidal Behavior (Lifetime): No  Calculated C-SSRS Risk Score (Lifetime/Recent):  "Low Risk  Step 1: Risk Factors  Current & Past Psychiatric Dx: Recent onset  Presenting Symptoms: Hopelessness or despair, Anxiety and/or panic  Precipitants/Stressors: Triggering events leading to humiliation, shame, and/or despair (e.g. loss of relationship, financial or health status) (real or anticipated), Inadequate social supports, Pending incarceration or homelessness  Change in Treatment: Non-compliant or not receiving treatment  Access to Lethal Methods : No  Step 2: Protective Factors   Protective Factors Internal: Other (Comment) (None)  Protective Factors External: Supportive social network or family or friends  Step 3: Suicidal Ideation Intensity  How Many Times Have You Had These Thoughts: Less than once a week  When You Have the Thoughts How Long do They Last : Fleeting - few seconds or minutes  Could/Can You Stop Thinking About Killing Yourself or Wanting to Die if You Want to: Easily able to control thoughts  Are There Things - Anyone or Anything - That Stopped You From Wanting to Die or Acting on: Uncertain that deterrents stopped you  What Sort of Reasons Did You Have For Thinking About Wanting to Die or Killing Yourself: Mostly to end or stop the pain (you couldn't go on living with the pain or how you were feeling)  Total Score: 10  Step 5: Documentation  Risk Level: Low suicide risk    Psychiatric Impression and Plan of Care  Assessment and Plan: Upon assessment Pt presents cooperative but somewhat anxious stating \"I just need a  and help with housing\". Pt minimizing severity of statements made to friend Kristin earlier today. Pt repeatedly states \"I am not that way\" and \"I would never hurt myself or anyone else\" but admits she is going through a lot this past week with  passing and not having stable housing at this time. Pt poor historian with what brought on comments today. She denies that she and friend had any arguments but reports Kristin was helping her look for somewhere " "else to stay. Pt has been staying Kristin's brother's home with her stating \"Kristin was a good friend of my \". Pt appears to believe she can not longer stay at the house with them though and repeatedly tells EPAT that she has \"multiple friends\" she can stay with. Pt could not provide a single name or phone number of a friend EPAT could contact to verify whether pt could safe. EPAT did attempt to contact Kristin for collateral information however no answer. Pt has been dealing with the stress and loss of her  this past Thursday. Pt denies any mental health history and denies being on any medications. She continues to state she \"was joking\" and that she \"is not that way\" however pt unable to guarantee safety at this time. After speaking with Pt, recieved collateral from friend Milad who verified Pt has no history of SI behavior. He would be willing to allow her to safety with him however needs to confirm with sister as it is her house. Pt presents with low risk at time of social work assessment however concerns for pt's safety at this time d/t limited support and lack of safe discharge options.  Specific Resources Provided to Patient: Peer support not needed.  Plan Comments: Diagnostic Impression: Depressive Disorder, NOS, secondary to grief. Plan for inpatient placement for safety concerns.    Outcome/Disposition  Patient's Perception of Outcome Achieved: Pt has limited insight into plan at this time.  Assessment, Recommendations and Risk Level Reviewed with: Reviewed case with Dr. Alvarado and HOANG Herrera.  Contact Relationship: Friend.  EPAT Assessment Completed Date: 09/30/24  EPAT Assessment Completed Time: 1815 "

## 2024-10-01 VITALS
DIASTOLIC BLOOD PRESSURE: 92 MMHG | BODY MASS INDEX: 21.66 KG/M2 | OXYGEN SATURATION: 98 % | RESPIRATION RATE: 16 BRPM | WEIGHT: 138 LBS | HEIGHT: 67 IN | TEMPERATURE: 98.6 F | HEART RATE: 83 BPM | SYSTOLIC BLOOD PRESSURE: 133 MMHG

## 2024-10-01 LAB
AMPHETAMINES UR QL SCN: ABNORMAL
APPEARANCE UR: CLEAR
ATRIAL RATE: 82 BPM
BARBITURATES UR QL SCN: ABNORMAL
BENZODIAZ UR QL SCN: ABNORMAL
BILIRUB UR STRIP.AUTO-MCNC: NEGATIVE MG/DL
BZE UR QL SCN: ABNORMAL
CANNABINOIDS UR QL SCN: ABNORMAL
COLOR UR: ABNORMAL
FENTANYL+NORFENTANYL UR QL SCN: ABNORMAL
GLUCOSE UR STRIP.AUTO-MCNC: NORMAL MG/DL
HCG UR QL IA.RAPID: NEGATIVE
KETONES UR STRIP.AUTO-MCNC: NEGATIVE MG/DL
LEUKOCYTE ESTERASE UR QL STRIP.AUTO: NEGATIVE
METHADONE UR QL SCN: ABNORMAL
MUCOUS THREADS #/AREA URNS AUTO: ABNORMAL /LPF
NITRITE UR QL STRIP.AUTO: NEGATIVE
OPIATES UR QL SCN: ABNORMAL
OXYCODONE+OXYMORPHONE UR QL SCN: ABNORMAL
P AXIS: 52 DEGREES
P OFFSET: 200 MS
P ONSET: 148 MS
PCP UR QL SCN: ABNORMAL
PH UR STRIP.AUTO: 5.5 [PH]
PR INTERVAL: 144 MS
PROT UR STRIP.AUTO-MCNC: NEGATIVE MG/DL
Q ONSET: 220 MS
QRS COUNT: 13 BEATS
QRS DURATION: 82 MS
QT INTERVAL: 362 MS
QTC CALCULATION(BAZETT): 422 MS
QTC FREDERICIA: 401 MS
R AXIS: 46 DEGREES
RBC # UR STRIP.AUTO: ABNORMAL /UL
RBC #/AREA URNS AUTO: >20 /HPF
SP GR UR STRIP.AUTO: 1.01
SQUAMOUS #/AREA URNS AUTO: ABNORMAL /HPF
T AXIS: 44 DEGREES
T OFFSET: 401 MS
UROBILINOGEN UR STRIP.AUTO-MCNC: NORMAL MG/DL
VENTRICULAR RATE: 82 BPM
WBC #/AREA URNS AUTO: ABNORMAL /HPF

## 2024-10-01 ASSESSMENT — COLUMBIA-SUICIDE SEVERITY RATING SCALE - C-SSRS
2. HAVE YOU ACTUALLY HAD ANY THOUGHTS OF KILLING YOURSELF?: NO
6. HAVE YOU EVER DONE ANYTHING, STARTED TO DO ANYTHING, OR PREPARED TO DO ANYTHING TO END YOUR LIFE?: NO
1. SINCE LAST CONTACT, HAVE YOU WISHED YOU WERE DEAD OR WISHED YOU COULD GO TO SLEEP AND NOT WAKE UP?: NO

## 2024-10-01 NOTE — ED PROVIDER NOTES
49-year-old female initially evaluated by the evening team and medically cleared for transfer to psychiatry.  The patient was requiring a urinalysis to complete the workup.  Urine sample is not consistent with urinary tract infection.  It is positive for cannabis but no other detectable substances.  She continues to be medically cleared for transfer.  She was accepted by Dr. Contreras at [facility] and transferred without incident.     Josefina Matson MD  10/01/24 0059

## 2025-08-27 ENCOUNTER — OFFICE VISIT (OUTPATIENT)
Dept: PRIMARY CARE | Facility: CLINIC | Age: 50
End: 2025-08-27
Payer: COMMERCIAL

## 2025-08-27 VITALS
WEIGHT: 154 LBS | DIASTOLIC BLOOD PRESSURE: 82 MMHG | BODY MASS INDEX: 24.17 KG/M2 | OXYGEN SATURATION: 99 % | HEART RATE: 67 BPM | HEIGHT: 67 IN | TEMPERATURE: 96.9 F | SYSTOLIC BLOOD PRESSURE: 142 MMHG

## 2025-08-27 DIAGNOSIS — Z23 NEED FOR TETANUS BOOSTER: ICD-10-CM

## 2025-08-27 DIAGNOSIS — Z11.59 NEED FOR HEPATITIS C SCREENING TEST: ICD-10-CM

## 2025-08-27 DIAGNOSIS — G80.9 CEREBRAL PALSY, UNSPECIFIED TYPE (MULTI): ICD-10-CM

## 2025-08-27 DIAGNOSIS — Z11.4 SCREENING FOR HUMAN IMMUNODEFICIENCY VIRUS: ICD-10-CM

## 2025-08-27 DIAGNOSIS — Z12.31 ENCOUNTER FOR SCREENING MAMMOGRAM FOR MALIGNANT NEOPLASM OF BREAST: ICD-10-CM

## 2025-08-27 DIAGNOSIS — Z86.59 HISTORY OF DEPRESSION: ICD-10-CM

## 2025-08-27 DIAGNOSIS — Z12.11 SCREENING FOR COLON CANCER: ICD-10-CM

## 2025-08-27 DIAGNOSIS — I10 HYPERTENSION, UNSPECIFIED TYPE: ICD-10-CM

## 2025-08-27 DIAGNOSIS — Z13.1 SCREENING FOR DIABETES MELLITUS: ICD-10-CM

## 2025-08-27 DIAGNOSIS — F41.9 ANXIETY: ICD-10-CM

## 2025-08-27 DIAGNOSIS — Z00.00 ANNUAL PHYSICAL EXAM: ICD-10-CM

## 2025-08-27 DIAGNOSIS — Z13.220 SCREENING FOR HYPERLIPIDEMIA: ICD-10-CM

## 2025-08-27 DIAGNOSIS — R56.9 SEIZURES (MULTI): Primary | ICD-10-CM

## 2025-08-27 PROCEDURE — 99214 OFFICE O/P EST MOD 30 MIN: CPT | Mod: 25 | Performed by: PHYSICIAN ASSISTANT

## 2025-08-27 PROCEDURE — 1036F TOBACCO NON-USER: CPT | Performed by: PHYSICIAN ASSISTANT

## 2025-08-27 PROCEDURE — 99386 PREV VISIT NEW AGE 40-64: CPT | Performed by: PHYSICIAN ASSISTANT

## 2025-08-27 PROCEDURE — 3077F SYST BP >= 140 MM HG: CPT | Performed by: PHYSICIAN ASSISTANT

## 2025-08-27 PROCEDURE — 99204 OFFICE O/P NEW MOD 45 MIN: CPT | Performed by: PHYSICIAN ASSISTANT

## 2025-08-27 PROCEDURE — 99386 PREV VISIT NEW AGE 40-64: CPT | Mod: 25 | Performed by: PHYSICIAN ASSISTANT

## 2025-08-27 PROCEDURE — 3079F DIAST BP 80-89 MM HG: CPT | Performed by: PHYSICIAN ASSISTANT

## 2025-08-27 PROCEDURE — 3008F BODY MASS INDEX DOCD: CPT | Performed by: PHYSICIAN ASSISTANT

## 2025-08-27 PROCEDURE — 90715 TDAP VACCINE 7 YRS/> IM: CPT | Performed by: PHYSICIAN ASSISTANT

## 2025-08-27 RX ORDER — HYDROCHLOROTHIAZIDE 25 MG/1
25 TABLET ORAL
COMMUNITY
Start: 2024-10-07 | End: 2025-08-27 | Stop reason: SDUPTHER

## 2025-08-27 RX ORDER — DIVALPROEX SODIUM 250 MG/1
250 TABLET, DELAYED RELEASE ORAL 2 TIMES DAILY
Qty: 60 TABLET | Refills: 1 | Status: SHIPPED | OUTPATIENT
Start: 2025-08-27

## 2025-08-27 RX ORDER — DIVALPROEX SODIUM 250 MG/1
250 TABLET, DELAYED RELEASE ORAL 2 TIMES DAILY
COMMUNITY
Start: 2024-10-07 | End: 2025-08-27 | Stop reason: SDUPTHER

## 2025-08-27 RX ORDER — HYDROCHLOROTHIAZIDE 25 MG/1
25 TABLET ORAL
Qty: 90 TABLET | Refills: 1 | Status: SHIPPED | OUTPATIENT
Start: 2025-08-27

## 2025-08-27 ASSESSMENT — ENCOUNTER SYMPTOMS
GASTROINTESTINAL NEGATIVE: 1
NUMBNESS: 0
ADENOPATHY: 0
BACK PAIN: 1
SPEECH DIFFICULTY: 0
CONFUSION: 0
DYSPHORIC MOOD: 0
MYALGIAS: 0
APPETITE CHANGE: 0
FATIGUE: 0
TREMORS: 0
NECK STIFFNESS: 0
SLEEP DISTURBANCE: 0
ARTHRALGIAS: 1
ENDOCRINE NEGATIVE: 1
FACIAL ASYMMETRY: 0
RESPIRATORY NEGATIVE: 1
HEADACHES: 0
BRUISES/BLEEDS EASILY: 0
SEIZURES: 1
DEPRESSION: 0
DIZZINESS: 0
LOSS OF SENSATION IN FEET: 0
NERVOUS/ANXIOUS: 1
JOINT SWELLING: 0
UNEXPECTED WEIGHT CHANGE: 0
LIGHT-HEADEDNESS: 0
FEVER: 0
ACTIVITY CHANGE: 0
WEAKNESS: 0
EYES NEGATIVE: 1
NECK PAIN: 0
HYPERACTIVE: 0
CHILLS: 0
DECREASED CONCENTRATION: 0
DIAPHORESIS: 0
OCCASIONAL FEELINGS OF UNSTEADINESS: 0
CARDIOVASCULAR NEGATIVE: 1
HALLUCINATIONS: 0
AGITATION: 0

## 2025-08-27 ASSESSMENT — PATIENT HEALTH QUESTIONNAIRE - PHQ9
1. LITTLE INTEREST OR PLEASURE IN DOING THINGS: NOT AT ALL
SUM OF ALL RESPONSES TO PHQ9 QUESTIONS 1 AND 2: 0
2. FEELING DOWN, DEPRESSED OR HOPELESS: NOT AT ALL

## 2025-08-27 ASSESSMENT — COLUMBIA-SUICIDE SEVERITY RATING SCALE - C-SSRS
1. IN THE PAST MONTH, HAVE YOU WISHED YOU WERE DEAD OR WISHED YOU COULD GO TO SLEEP AND NOT WAKE UP?: NO
2. HAVE YOU ACTUALLY HAD ANY THOUGHTS OF KILLING YOURSELF?: NO
6. HAVE YOU EVER DONE ANYTHING, STARTED TO DO ANYTHING, OR PREPARED TO DO ANYTHING TO END YOUR LIFE?: NO

## 2025-08-27 ASSESSMENT — PAIN SCALES - GENERAL: PAINLEVEL_OUTOF10: 0-NO PAIN

## 2025-10-28 ENCOUNTER — APPOINTMENT (OUTPATIENT)
Dept: BEHAVIORAL HEALTH | Facility: CLINIC | Age: 50
End: 2025-10-28
Payer: COMMERCIAL

## 2025-11-12 ENCOUNTER — APPOINTMENT (OUTPATIENT)
Dept: BEHAVIORAL HEALTH | Facility: CLINIC | Age: 50
End: 2025-11-12
Payer: COMMERCIAL